# Patient Record
Sex: FEMALE | Race: AMERICAN INDIAN OR ALASKA NATIVE
[De-identification: names, ages, dates, MRNs, and addresses within clinical notes are randomized per-mention and may not be internally consistent; named-entity substitution may affect disease eponyms.]

---

## 2017-08-05 ENCOUNTER — HOSPITAL ENCOUNTER (EMERGENCY)
Dept: HOSPITAL 5 - ED | Age: 27
Discharge: HOME | End: 2017-08-05
Payer: COMMERCIAL

## 2017-08-05 VITALS — SYSTOLIC BLOOD PRESSURE: 141 MMHG | DIASTOLIC BLOOD PRESSURE: 75 MMHG

## 2017-08-05 DIAGNOSIS — F17.210: ICD-10-CM

## 2017-08-05 DIAGNOSIS — R10.9: ICD-10-CM

## 2017-08-05 DIAGNOSIS — R11.0: ICD-10-CM

## 2017-08-05 DIAGNOSIS — F12.10: ICD-10-CM

## 2017-08-05 DIAGNOSIS — Z33.1: Primary | ICD-10-CM

## 2017-08-05 LAB
ANION GAP SERPL CALC-SCNC: 16 MMOL/L
BASOPHILS NFR BLD AUTO: 0.5 % (ref 0–1.8)
BILIRUB UR QL STRIP: (no result)
BLOOD UR QL VISUAL: (no result)
BUN SERPL-MCNC: 12 MG/DL (ref 7–17)
BUN/CREAT SERPL: 17.14 %
CALCIUM SERPL-MCNC: 9.4 MG/DL (ref 8.4–10.2)
CHLORIDE SERPL-SCNC: 99.7 MMOL/L (ref 98–107)
CO2 SERPL-SCNC: 25 MMOL/L (ref 22–30)
EOSINOPHIL NFR BLD AUTO: 1.5 % (ref 0–4.3)
GLUCOSE SERPL-MCNC: 81 MG/DL (ref 65–100)
HCT VFR BLD CALC: 40.7 % (ref 30.3–42.9)
HGB BLD-MCNC: 13.5 GM/DL (ref 10.1–14.3)
KETONES UR STRIP-MCNC: (no result) MG/DL
LEUKOCYTE ESTERASE UR QL STRIP: (no result)
MCH RBC QN AUTO: 31 PG (ref 28–32)
MCHC RBC AUTO-ENTMCNC: 33 % (ref 30–34)
MCV RBC AUTO: 92 FL (ref 79–97)
MUCOUS THREADS #/AREA URNS HPF: (no result) /HPF
NITRITE UR QL STRIP: (no result)
PH UR STRIP: 8 [PH] (ref 5–7)
PLATELET # BLD: 144 K/MM3 (ref 140–440)
POTASSIUM SERPL-SCNC: 4.2 MMOL/L (ref 3.6–5)
PROT UR STRIP-MCNC: (no result) MG/DL
RBC # BLD AUTO: 4.4 M/MM3 (ref 3.65–5.03)
RBC #/AREA URNS HPF: 1 /HPF (ref 0–6)
SODIUM SERPL-SCNC: 136 MMOL/L (ref 137–145)
UROBILINOGEN UR-MCNC: < 2 MG/DL (ref ?–2)
WBC # BLD AUTO: 8.5 K/MM3 (ref 4.5–11)
WBC #/AREA URNS HPF: < 1 /HPF (ref 0–6)

## 2017-08-05 PROCEDURE — 81001 URINALYSIS AUTO W/SCOPE: CPT

## 2017-08-05 PROCEDURE — 85025 COMPLETE CBC W/AUTO DIFF WBC: CPT

## 2017-08-05 PROCEDURE — 99283 EMERGENCY DEPT VISIT LOW MDM: CPT

## 2017-08-05 PROCEDURE — 36415 COLL VENOUS BLD VENIPUNCTURE: CPT

## 2017-08-05 PROCEDURE — 80048 BASIC METABOLIC PNL TOTAL CA: CPT

## 2017-08-05 PROCEDURE — 84702 CHORIONIC GONADOTROPIN TEST: CPT

## 2017-08-05 NOTE — EMERGENCY DEPARTMENT REPORT
ED Abdominal Pain HPI





- General


Chief Complaint: Urogenital-Female


Stated Complaint: ABD PAIN


Time Seen by Provider: 08/05/17 16:18


Source: patient


Mode of arrival: Ambulatory


Limitations: No Limitations





- History of Present Illness


-: Gradual


Context: other (thinks she may be preg but did not do a home preg test)


Associated Symptoms: nausea (mild).  denies: vomiting, diarrhea, fever, chills, 

constipation, dysuria, hematemesis, hematochezia, melena, hematuria, anorexia, 

syncope, other (no vag bleed or dc)





- Related Data


 Previous Rx's











 Medication  Instructions  Recorded  Last Taken  Type


 


Prenatal Vit-Fe Fumar-FA [Prenatal 1 tab PO QDAY #30 tablet 08/05/17 Unknown Rx





Vitamin]    











 Allergies











Allergy/AdvReac Type Severity Reaction Status Date / Time


 


No Known Allergies Allergy   Verified 08/05/17 15:19














ED Review of Systems


ROS: 


Stated complaint: ABD PAIN


Other details as noted in HPI





Comment: All other systems reviewed and negative


Constitutional: no symptoms reported, see HPI


Eyes: as per HPI


ENT: as per HPI


Respiratory: no symptoms reported


Cardiovascular: as per HPI


Endocrine: no symptoms reported, see HPI, other (breast tenderness)


Gastrointestinal: as per HPI, nausea


Genitourinary: as per HPI, other (lmp 6-28)


Musculoskeletal: as per HPI


Skin: as per HPI


Neurological: as per HPI


Psychiatric: as per HPI


Hematological/Lymphatic: as per HPI





ED Past Medical Hx





- Past Medical History


Previous Medical History?: No





- Surgical History


Past Surgical History?: No





- Family History


Family history: no significant





- Social History


Smoking Status: Current Every Day Smoker


Substance Use Type: Alcohol, Marijuana





- Medications


Home Medications: 


 Home Medications











 Medication  Instructions  Recorded  Confirmed  Last Taken  Type


 


Prenatal Vit-Fe Fumar-FA [Prenatal 1 tab PO QDAY #30 tablet 08/05/17  Unknown Rx





Vitamin]     














ED Physical Exam





- General


Limitations: No Limitations


General appearance: alert





- Head


Head exam: Present: atraumatic





- Eye


Eye exam: Present: normal appearance


Pupils: Present: normal accommodation





- ENT


ENT exam: Present: mucous membranes moist





- Neck


Neck exam: Present: normal inspection





- Respiratory


Respiratory exam: Present: normal lung sounds bilaterally





- Cardiovascular


Cardiovascular Exam: Present: regular rate





- GI/Abdominal


GI/Abdominal exam: Present: soft, normal bowel sounds.  Absent: distended, 

tenderness, guarding, rebound, rigid, diminished bowel sounds





- Rectal


Rectal exam: Present: deferred





- Extremities Exam


Extremities exam: Present: normal inspection, full ROM, normal capillary 

refill.  Absent: tenderness, pedal edema, joint swelling





- Back Exam


Back exam: Present: normal inspection.  Absent: full ROM, tenderness, CVA 

tenderness (R), CVA tenderness (L)





- Neurological Exam


Neurological exam: Present: alert, oriented X3, CN II-XII intact, normal gait





- Psychiatric


Psychiatric exam: Present: normal affect, normal mood





- Skin


Skin exam: Present: warm, dry, intact, normal color





ED Course


 Vital Signs











  08/05/17





  15:20


 


Temperature 98.0 F


 


Pulse Rate 80


 


Respiratory 17





Rate 


 


Blood Pressure 134/98


 


O2 Sat by Pulse 100





Oximetry 














- Reevaluation(s)


Reevaluation #1: 





08/05/17 17:23


to er to see if preg


lmp 6-28


no vag bleed or dc


breast tender


did not take home test


this would be first preg





no abd tenderness or pain


states just little nausea


smiling and giddy.


taking po


ambulatory





dc home w obgyn fu


edc 4-3 based on lmp








ED Medical Decision Making





- Lab Data


Result diagrams: 


 08/05/17 16:25





 08/05/17 16:25





- Medical Decision Making





here bc she thinks she is preg


denies taking home test


breast tender


lmp 6-28


no vag bleed or dc


g1- this preg is first


not concerned for std


would be happy to be preg


bf at bedside


Critical care attestation.: 


If time is entered above; I have spent that time in minutes in the direct care 

of this critically ill patient, excluding procedure time.








ED Disposition


Clinical Impression: 


 IUP (intrauterine pregnancy), incidental





Disposition: DC-01 TO HOME OR SELFCARE


Is pt being admited?: No


Does the pt Need Aspirin: No


Condition: Stable


Instructions:  Pregnancy (ED)


Additional Instructions: 


rest





fluids





vitamins





no drugs or alcohol





eat well





follow up obgyn





Prescriptions: 


Prenatal Vit-Fe Fumar-FA [Prenatal Vitamin] 1 tab PO QDAY #30 tablet


Referrals: 


PRIMARY CARE,MD [Primary Care Provider] - 3-5 Days


MARITZA ORDONEZ MD [Staff Physician] - 3-5 Days


Time of Disposition: 17:10

## 2017-08-14 ENCOUNTER — HOSPITAL ENCOUNTER (EMERGENCY)
Dept: HOSPITAL 5 - ED | Age: 27
Discharge: LEFT BEFORE DISCHARGE | End: 2017-08-14
Payer: SELF-PAY

## 2017-08-14 VITALS — DIASTOLIC BLOOD PRESSURE: 79 MMHG | SYSTOLIC BLOOD PRESSURE: 136 MMHG

## 2017-08-14 DIAGNOSIS — Z53.21: ICD-10-CM

## 2017-08-14 DIAGNOSIS — N93.9: Primary | ICD-10-CM

## 2017-08-14 LAB
ANION GAP SERPL CALC-SCNC: 19 MMOL/L
BASOPHILS NFR BLD AUTO: 0.6 % (ref 0–1.8)
BILIRUB UR QL STRIP: (no result)
BLOOD UR QL VISUAL: (no result)
BUN SERPL-MCNC: 17 MG/DL (ref 7–17)
BUN/CREAT SERPL: 21.25 %
CALCIUM SERPL-MCNC: 9.7 MG/DL (ref 8.4–10.2)
CHLORIDE SERPL-SCNC: 99.8 MMOL/L (ref 98–107)
CO2 SERPL-SCNC: 23 MMOL/L (ref 22–30)
EOSINOPHIL NFR BLD AUTO: 1.2 % (ref 0–4.3)
GLUCOSE SERPL-MCNC: 104 MG/DL (ref 65–100)
HCT VFR BLD CALC: 43.2 % (ref 30.3–42.9)
HGB BLD-MCNC: 13.9 GM/DL (ref 10.1–14.3)
INR PPP: 0.94 (ref 0.87–1.13)
KETONES UR STRIP-MCNC: (no result) MG/DL
LEUKOCYTE ESTERASE UR QL STRIP: (no result)
MCH RBC QN AUTO: 30 PG (ref 28–32)
MCHC RBC AUTO-ENTMCNC: 32 % (ref 30–34)
MCV RBC AUTO: 93 FL (ref 79–97)
NITRITE UR QL STRIP: (no result)
PH UR STRIP: 7 [PH] (ref 5–7)
PLATELET # BLD: 161 K/MM3 (ref 140–440)
POTASSIUM SERPL-SCNC: 4.2 MMOL/L (ref 3.6–5)
RBC # BLD AUTO: 4.67 M/MM3 (ref 3.65–5.03)
RBC #/AREA URNS HPF: > 182 /HPF (ref 0–6)
SODIUM SERPL-SCNC: 138 MMOL/L (ref 137–145)
UROBILINOGEN UR-MCNC: < 2 MG/DL (ref ?–2)
WBC # BLD AUTO: 8.4 K/MM3 (ref 4.5–11)
WBC #/AREA URNS HPF: > 182 /HPF (ref 0–6)

## 2017-08-14 PROCEDURE — 86850 RBC ANTIBODY SCREEN: CPT

## 2017-08-14 PROCEDURE — 36415 COLL VENOUS BLD VENIPUNCTURE: CPT

## 2017-08-14 PROCEDURE — 84702 CHORIONIC GONADOTROPIN TEST: CPT

## 2017-08-14 PROCEDURE — 80048 BASIC METABOLIC PNL TOTAL CA: CPT

## 2017-08-14 PROCEDURE — 81001 URINALYSIS AUTO W/SCOPE: CPT

## 2017-08-14 PROCEDURE — 86901 BLOOD TYPING SEROLOGIC RH(D): CPT

## 2017-08-14 PROCEDURE — 85025 COMPLETE CBC W/AUTO DIFF WBC: CPT

## 2017-08-14 PROCEDURE — 86900 BLOOD TYPING SEROLOGIC ABO: CPT

## 2017-08-14 PROCEDURE — 85610 PROTHROMBIN TIME: CPT

## 2019-08-06 ENCOUNTER — HOSPITAL ENCOUNTER (INPATIENT)
Dept: HOSPITAL 5 - LD | Age: 29
LOS: 2 days | Discharge: HOME | End: 2019-08-08
Attending: OBSTETRICS & GYNECOLOGY | Admitting: OBSTETRICS & GYNECOLOGY
Payer: MEDICAID

## 2019-08-06 DIAGNOSIS — Z3A.38: ICD-10-CM

## 2019-08-06 LAB
HCT VFR BLD CALC: 37.2 % (ref 30.3–42.9)
HCT VFR BLD CALC: 39.1 % (ref 30.3–42.9)
HGB BLD-MCNC: 12.4 GM/DL (ref 10.1–14.3)
HGB BLD-MCNC: 12.9 GM/DL (ref 10.1–14.3)
MCHC RBC AUTO-ENTMCNC: 33 % (ref 30–34)
MCV RBC AUTO: 95 FL (ref 79–97)
PLATELET # BLD: 111 K/MM3 (ref 140–440)
RBC # BLD AUTO: 3.94 M/MM3 (ref 3.65–5.03)

## 2019-08-06 PROCEDURE — 86900 BLOOD TYPING SEROLOGIC ABO: CPT

## 2019-08-06 PROCEDURE — 80307 DRUG TEST PRSMV CHEM ANLYZR: CPT

## 2019-08-06 PROCEDURE — 36415 COLL VENOUS BLD VENIPUNCTURE: CPT

## 2019-08-06 PROCEDURE — 86762 RUBELLA ANTIBODY: CPT

## 2019-08-06 PROCEDURE — 85014 HEMATOCRIT: CPT

## 2019-08-06 PROCEDURE — 86706 HEP B SURFACE ANTIBODY: CPT

## 2019-08-06 PROCEDURE — 86850 RBC ANTIBODY SCREEN: CPT

## 2019-08-06 PROCEDURE — 86592 SYPHILIS TEST NON-TREP QUAL: CPT

## 2019-08-06 PROCEDURE — 87806 HIV AG W/HIV1&2 ANTB W/OPTIC: CPT

## 2019-08-06 PROCEDURE — 85018 HEMOGLOBIN: CPT

## 2019-08-06 PROCEDURE — 86901 BLOOD TYPING SEROLOGIC RH(D): CPT

## 2019-08-06 PROCEDURE — 85027 COMPLETE CBC AUTOMATED: CPT

## 2019-08-06 RX ADMIN — DOCUSATE SODIUM SCH MG: 100 CAPSULE, LIQUID FILLED ORAL at 21:57

## 2019-08-06 RX ADMIN — IBUPROFEN SCH MG: 600 TABLET, FILM COATED ORAL at 18:09

## 2019-08-06 RX ADMIN — IBUPROFEN SCH MG: 600 TABLET, FILM COATED ORAL at 23:54

## 2019-08-06 RX ADMIN — FERROUS SULFATE TAB 325 MG (65 MG ELEMENTAL FE) SCH MG: 325 (65 FE) TAB at 21:57

## 2019-08-06 NOTE — PROCEDURE NOTE
OB Delivery Note





- Delivery


Date of Delivery: 19


Surgeon: PHYLICIA BLUM


Estimated blood loss: 100cc





- Vaginal


Delivery presentation: vertex


Delivery position: OA


Intrapartum events: precipitous labor- <3hr


Delivery monitor: none


Route of delivery: 


Delivery placenta: spontaneous


Delivery cord: 3 umbilical vessels


Episiotomy: none


Delivery laceration: none


Anesthesia: none


Delivery comments: 


Patient arrived via EMS in active labor. Patient was  at presentation. 

The patient was transferred to a LDR. The pushed to deliver a liveborn female 

infant with apgars of 8/9 and weight of 2.85kg. After delivery of head, the 

shoulders delivered without difficulty. The cord was clamped and cut and the 

infant placed on the warmer. The placenta delivered spontaneously intact with a 

3VC. EBL 100ml. No lacerations noted. 








- Infant


  ** A


Apgar at 1 minute: 8


Apgar at 5 minutes: 9


Infant Gender: Female (weight 2.85kg)

## 2019-08-06 NOTE — PROCEDURE NOTE
OB Delivery Note





- Delivery


Date of Delivery: 19


Surgeon: PHYLICIA BLUM


Estimated blood loss: 100cc





- Vaginal


Delivery presentation: vertex


Delivery position: OA


Intrapartum events: none


Delivery induction: none


Delivery augmentation: rupture of membranes


Delivery monitor: external FHT, external uterine


Route of delivery: 


Delivery placenta: spontaneous


Delivery cord: 3 umbilical vessels


Episiotomy: none


Delivery laceration: none


Anesthesia: none


Delivery comments: 





Infant delivered OA





- Infant


  ** A


Apgar at 1 minute: 8


Apgar at 5 minutes: 9


Infant Gender: Female (2847gms)

## 2019-08-06 NOTE — HISTORY AND PHYSICAL REPORT
History of Present Illness


Date of examination: 19


Date of admission: 


19 03:10





History of present illness: 





Pt is a 27yo BF  EDC 19;  EGA 38 2/7 weeks presents to L&D complaining 

of SROM @ 0256 followed by RUC's q 2-4 mins.  She receieved prenatal care at 

[facility] and  care was unremarkable per pt. Prenatal 

records are not available and GBS is unknown.





Past History


Past Medical History: no pertinent history


Past Surgical History: no surgical history


Family/Genetic History: none


Social history: no significant social history, single





- Obstetrical History


Expected Date of Delivery: 19


Actual Gestation: 38 Week(s) 3 Day(s) 


: 3





Medications and Allergies


                                    Allergies











Allergy/AdvReac Type Severity Reaction Status Date / Time


 


No Known Allergies Allergy   Verified 17 15:19











                                Home Medications











 Medication  Instructions  Recorded  Confirmed  Last Taken  Type


 


Prenatal Vit-Fe Fumar-FA [Prenatal 1 tab PO QDAY #30 tablet 17 

Unknown Rx





Vitamin]     














Review of Systems


All systems: negative





- Vital Signs


Vital signs: 


                                   Vital Signs











Pulse BP


 


 76   117/66 


 


 19 03:56  19 03:56








                                        











Temp Pulse Resp BP Pulse Ox


 


    76      108/59    


 


    19 04:11     19 04:11   














- Physical Exam


Breasts: Positive: deferred


Cardiovascular: Regular rate


Lungs: Positive: Clear to auscultation


Abdomen: Positive: normal appearance


Genitourinary (Female): Positive: normal external genitalia


Uterus: Positive: enlarged


Extremities: Positive: normal





- Obstetrical


FHR: category 1


Uterine Contraction Monitor Mode: External


Uterine Contraction Pattern: Regular


Uterine Tone Measurement Phase: Contraction


Uterine Contraction Intensity: Strong/Firm





Results


Result Diagrams: 


                                 19 06:05





All other labs normal.








Assessment and Plan





- Patient Problems


(1) 38 weeks gestation of pregnancy


Onset Date: 19   Current Visit: Yes   Status: Acute   


Plan to address problem: 


A:  IUP @ 38 3/7 weeks in labor


      Unknown GBS





 P:  Admit to L&D for expectant vaginal delivery


      Obtain Prenatal records

## 2019-08-07 LAB
BENZODIAZEPINES SCREEN,URINE: (no result)
METHADONE SCREEN,URINE: (no result)
OPIATE SCREEN,URINE: (no result)

## 2019-08-07 RX ADMIN — DOCUSATE SODIUM SCH MG: 100 CAPSULE, LIQUID FILLED ORAL at 12:49

## 2019-08-07 RX ADMIN — FERROUS SULFATE TAB 325 MG (65 MG ELEMENTAL FE) SCH MG: 325 (65 FE) TAB at 22:55

## 2019-08-07 RX ADMIN — Medication SCH EACH: at 12:49

## 2019-08-07 RX ADMIN — IBUPROFEN SCH MG: 600 TABLET, FILM COATED ORAL at 12:48

## 2019-08-07 RX ADMIN — FERROUS SULFATE TAB 325 MG (65 MG ELEMENTAL FE) SCH MG: 325 (65 FE) TAB at 12:48

## 2019-08-07 RX ADMIN — DOCUSATE SODIUM SCH MG: 100 CAPSULE, LIQUID FILLED ORAL at 22:55

## 2019-08-07 RX ADMIN — IBUPROFEN SCH MG: 600 TABLET, FILM COATED ORAL at 22:59

## 2019-08-07 RX ADMIN — IBUPROFEN SCH MG: 600 TABLET, FILM COATED ORAL at 05:05

## 2019-08-07 RX ADMIN — IBUPROFEN SCH MG: 600 TABLET, FILM COATED ORAL at 17:47

## 2019-08-07 NOTE — PROGRESS NOTE
Assessment and Plan





A: PPD#1 s/p  at term doing well


P: Routine care. Anticipate discharge tomorrow morning per pt request. 





Subjective





- Subjective


Date of service: 19


Principal diagnosis: s/p  at term 


Interval history: 





Pt without complaints.Requests discharge tomorrow morning. 


Patient reports: appetite normal, voiding normally, pain well controlled, 

ambulating normally


[place]: doing well





Objective





- Vital Signs


Latest vital signs: 


                                   Vital Signs











  Temp Pulse Resp BP BP Pulse Ox


 


 19 15:28  98.1 F  89  20   113/60 


 


 19 08:25  98.4 F  72  20   109/70 


 


 19 05:05    20   


 


 19 23:55  98.1 F  102 H  20  119/60   97


 


 19 23:54    18   








                                Intake and Output











 19





 06:59 14:59 22:59


 


Intake Total 120 120 320


 


Balance 120 120 320


 


Intake:   


 


  Oral  120 320


 


  Intake, [place] 120  


 


Other:   


 


  Total, Intake Amount  120 320


 


  # Voids   


 


    Void 1 1 1














- Exam


Breasts: Present: deferred


Cardiovascular: Present: Regular rate


Lungs: Present: Clear to auscultation


Abdomen: Present: soft


Uterus: Present: fundal height at umbilicus


Extremities: Present: normal

## 2019-08-07 NOTE — DISCHARGE SUMMARY
Providers





- Providers


Date of Admission: 


19 03:10





Date of discharge: 19


Attending physician: 


PHYLICIA BLUM





                                        





19 03:54


Consult to Case Management [CONS] Routine 


   Services Needed at Discharge: 


   Notified:: no


   Was contact made?: No


   Additional Physician Instructions: pt urine drug test is positive for 

marijuana











Primary care physician: 


PHYLICIA BLUM








Hospitalization


Reason for admission: active labor


Delivery: 


Procedure details: 





Please see delivery note. 


Episiotomy: none


Laceration: none


Other postpartum procedures: none


Postpartum complications: none


Discharge diagnosis: IUP at term delivered


 baby: female


Hospital course: 





Pt was admitted in active labor and went on to have a spontaneous vaginal 

delivery which she tolerated well. Her postpartum course was uncomplicated and 

she met discharge criteria on PPD#2. She will follow up in 6 wks in the office. 


Condition at discharge: Stable


Disposition: DC- TO HOME OR SELFCARE





- Discharge Diagnoses


(1) Term birth of female 


Status: Acute   





(2) Labor, precipitous


Status: Acute   





Plan





- Discharge Medications


Prescriptions: 


Ibuprofen [Motrin] 800 mg PO Q8HR PRN #30 tablet


 PRN Reason: Pain, Moderate (4-6)


HYDROcodone/APAP 5-325 [[place] 5/325] 1 each PO Q6HR PRN #15 tablet


 PRN Reason: Pain





- Provider Discharge Summary


Activity: routine, no sex for 6 weeks, no heavy lifting 4 weeks, no strenuous 

exercise


Diet: routine


Instructions: routine


Additional instructions: 


[]  Smoking cessation referral if applicable(refer to patient education folder 

for contact #)


[]  Refer to [facility] Booklet








Call your doctor immediately for:


* Fever > 100.5


* Heavy vaginal bleeding ( >1 pad per hour)


* Severe persistent headache


* Shortness of breath


* Reddened, hot, painful area to leg or breast


* Drainage or odor from incision.





* Keep incision clean and dry at all times and follow doctor's instructions 

regarding bathing/showering











- Follow up plan


Follow up: 


LEONARDO FU CNM [Advanced Practice Nurse] - 6 Weeks (Please call to 

schedule your postpartum appt )


Forms:  [facility] Discharge Summary, Discharge Signature Page

## 2019-08-08 VITALS — SYSTOLIC BLOOD PRESSURE: 118 MMHG | DIASTOLIC BLOOD PRESSURE: 64 MMHG

## 2019-08-08 RX ADMIN — IBUPROFEN SCH MG: 600 TABLET, FILM COATED ORAL at 11:15

## 2019-08-08 RX ADMIN — IBUPROFEN SCH MG: 600 TABLET, FILM COATED ORAL at 04:28

## 2019-08-08 RX ADMIN — IBUPROFEN SCH: 600 TABLET, FILM COATED ORAL at 06:12

## 2019-08-08 RX ADMIN — DOCUSATE SODIUM SCH MG: 100 CAPSULE, LIQUID FILLED ORAL at 11:15

## 2019-08-08 RX ADMIN — Medication SCH EACH: at 11:15

## 2019-08-08 RX ADMIN — FERROUS SULFATE TAB 325 MG (65 MG ELEMENTAL FE) SCH MG: 325 (65 FE) TAB at 11:15
